# Patient Record
Sex: FEMALE | Race: WHITE | Employment: OTHER | URBAN - METROPOLITAN AREA
[De-identification: names, ages, dates, MRNs, and addresses within clinical notes are randomized per-mention and may not be internally consistent; named-entity substitution may affect disease eponyms.]

---

## 2021-10-29 ENCOUNTER — HOSPITAL ENCOUNTER (OUTPATIENT)
Dept: NEUROLOGY | Age: 56
Discharge: HOME | End: 2021-10-29
Payer: MEDICARE

## 2021-10-29 DIAGNOSIS — G40.909 NONINTRACTABLE EPILEPSY WITHOUT STATUS EPILEPTICUS, UNSPECIFIED EPILEPSY TYPE (HCC): ICD-10-CM

## 2021-10-29 PROCEDURE — 95819 EEG AWAKE AND ASLEEP: CPT

## 2021-10-29 PROCEDURE — 95816 EEG AWAKE AND DROWSY: CPT | Performed by: STUDENT IN AN ORGANIZED HEALTH CARE EDUCATION/TRAINING PROGRAM

## 2021-10-29 NOTE — PROCEDURES
ROUTINE ELECTROENCEPHALOGRAM    Identifying Information:  Name: Shanda Flores  MRN: [id number]  : 1965  Interpreting Physician: Tracey Talavera DO  Referring Physician: Killian Peguero DO  Date of EEG: 10/29/21  Procedure Location: Outpatient     Clinical History:  Shanda Flores is a 60 y.o. female with seizure like activity. Indication:  Rule out seizure/seizure disorder     Technical Summary:  28 channels of EEG were recorded in a digital format on a patient who was reported to be awake and drowsy during the recording. The patient was not sleep deprived prior to the EEG. The PDR consisted of well-developed, well-regulated 10-11 Hz alpha activity, maximal over the posterior head regions and reactive to eye opening and closure. Photic stimulation was performed and did not produce any abnormalities. During the recording stage II sleep was not seen. The EKG lead revealed no rhythm abnormalities. EEG Interpretation: This EEG was within normal limits for a patient of this age in the awake and drowsy states. No focal, lateralizing, or epileptiform features were seen during the recording. Clinical correlation is recommended.     Tracey Talavera DO  10/29/2021 2:10 PM

## 2021-11-11 ENCOUNTER — HOSPITAL ENCOUNTER (OUTPATIENT)
Dept: WOMENS IMAGING | Age: 56
Discharge: HOME | End: 2021-11-11
Payer: MEDICARE

## 2021-11-11 ENCOUNTER — TELEPHONE (OUTPATIENT)
Dept: NEUROLOGY | Age: 56
End: 2021-11-11

## 2021-11-11 DIAGNOSIS — Z12.31 ENCOUNTER FOR SCREENING MAMMOGRAM FOR MALIGNANT NEOPLASM OF BREAST: ICD-10-CM

## 2021-11-11 DIAGNOSIS — N95.9 POSTMENOPAUSAL SYMPTOMS: ICD-10-CM

## 2021-11-11 PROCEDURE — 77063 BREAST TOMOSYNTHESIS BI: CPT

## 2021-11-11 PROCEDURE — 77080 DXA BONE DENSITY AXIAL: CPT

## 2021-11-12 NOTE — TELEPHONE ENCOUNTER
Apologies for the delay. I had received the results and saw she was a DCD patient last and therefore I told one of my [facility] [address] staff to call her regarding the results. They must not have done so. Her EEG was normal.  Her right-sided shaking does not represent any epileptic activity.

## 2021-11-17 NOTE — TELEPHONE ENCOUNTER
Left message letting Mattie know if tremors are still bothering her to call us and we can set up a follow up appointment.

## 2021-12-23 ENCOUNTER — HOSPITAL ENCOUNTER (OUTPATIENT)
Dept: ULTRASOUND IMAGING | Age: 56
Discharge: HOME | End: 2021-12-23
Payer: MEDICARE

## 2021-12-23 ENCOUNTER — HOSPITAL ENCOUNTER (OUTPATIENT)
Dept: GENERAL RADIOLOGY | Age: 56
Discharge: HOME | End: 2021-12-23
Payer: MEDICARE

## 2021-12-23 ENCOUNTER — HOSPITAL ENCOUNTER (OUTPATIENT)
Dept: CT IMAGING | Age: 56
Discharge: HOME | End: 2021-12-23
Payer: MEDICARE

## 2021-12-23 ENCOUNTER — HOSPITAL ENCOUNTER (OUTPATIENT)
Age: 56
Discharge: HOME | End: 2021-12-23
Payer: MEDICARE

## 2021-12-23 DIAGNOSIS — Z82.49 FAMILY HISTORY OF ISCHEMIC HEART DISEASE: ICD-10-CM

## 2021-12-23 DIAGNOSIS — R10.11 ABDOMINAL PAIN, RIGHT UPPER QUADRANT: ICD-10-CM

## 2021-12-23 DIAGNOSIS — M25.522 LEFT ELBOW PAIN: ICD-10-CM

## 2021-12-23 DIAGNOSIS — Z82.49 FH: ANEURYSM: ICD-10-CM

## 2021-12-23 LAB
GFR AFRICAN AMERICAN: >60 ML/MIN/1.73M2
GFR NON-AFRICAN AMERICAN: >60 ML/MIN/1.73M2
POC CREATININE: 0.7 MG/DL (ref 0.6–1.1)

## 2021-12-23 PROCEDURE — 70460 CT HEAD/BRAIN W/DYE: CPT

## 2021-12-23 PROCEDURE — 76700 US EXAM ABDOM COMPLETE: CPT

## 2021-12-23 PROCEDURE — 2580000003 HC RX 258: Performed by: NURSE PRACTITIONER

## 2021-12-23 PROCEDURE — 73080 X-RAY EXAM OF ELBOW: CPT

## 2021-12-23 PROCEDURE — 6360000004 HC RX CONTRAST MEDICATION: Performed by: NURSE PRACTITIONER

## 2021-12-23 RX ORDER — SODIUM CHLORIDE 0.9 % (FLUSH) 0.9 %
5-40 SYRINGE (ML) INJECTION PRN
Status: DISCONTINUED | OUTPATIENT
Start: 2021-12-23 | End: 2021-12-24 | Stop reason: HOSPADM

## 2021-12-23 RX ADMIN — IOPAMIDOL 75 ML: 755 INJECTION, SOLUTION INTRAVENOUS at 10:40

## 2021-12-23 RX ADMIN — SODIUM CHLORIDE, PRESERVATIVE FREE 10 ML: 5 INJECTION INTRAVENOUS at 10:40

## 2022-02-18 ENCOUNTER — HOSPITAL ENCOUNTER (OUTPATIENT)
Dept: GENERAL RADIOLOGY | Age: 57
Discharge: HOME | End: 2022-02-18
Payer: MEDICARE

## 2022-02-18 ENCOUNTER — HOSPITAL ENCOUNTER (OUTPATIENT)
Age: 57
Discharge: HOME | End: 2022-02-18
Payer: MEDICARE

## 2022-02-18 DIAGNOSIS — M54.50 LOW BACK PAIN, UNSPECIFIED BACK PAIN LATERALITY, UNSPECIFIED CHRONICITY, UNSPECIFIED WHETHER SCIATICA PRESENT: ICD-10-CM

## 2022-02-18 DIAGNOSIS — M25.559 PAIN IN JOINT INVOLVING PELVIC REGION AND THIGH, UNSPECIFIED LATERALITY: ICD-10-CM

## 2022-02-18 PROCEDURE — 72100 X-RAY EXAM L-S SPINE 2/3 VWS: CPT

## 2022-02-18 PROCEDURE — 73502 X-RAY EXAM HIP UNI 2-3 VIEWS: CPT

## 2022-05-06 RX ORDER — LAMOTRIGINE 200 MG/1
200 TABLET ORAL 2 TIMES DAILY
Qty: 60 TABLET | Refills: 3 | Status: SHIPPED | OUTPATIENT
Start: 2022-05-06 | End: 2022-07-26 | Stop reason: SDUPTHER

## 2022-05-06 NOTE — TELEPHONE ENCOUNTER
Received call from pt stating she needs Lamictal refill and will be out of med Sunday. She saw Dr. Jeffry Thomason at [facility] and is scheduled to f/u here 7/25/22. Rx pending with pt reported dose. Please verify in [phone].

## 2022-07-26 ENCOUNTER — HOSPITAL ENCOUNTER (OUTPATIENT)
Age: 57
Discharge: HOME | End: 2022-07-26
Payer: MEDICARE

## 2022-07-26 ENCOUNTER — OFFICE VISIT (OUTPATIENT)
Dept: NEUROLOGY | Age: 57
End: 2022-07-26
Payer: MEDICARE

## 2022-07-26 VITALS
WEIGHT: 249.2 LBS | SYSTOLIC BLOOD PRESSURE: 116 MMHG | BODY MASS INDEX: 44.16 KG/M2 | DIASTOLIC BLOOD PRESSURE: 70 MMHG | HEART RATE: 70 BPM | OXYGEN SATURATION: 96 % | HEIGHT: 63 IN

## 2022-07-26 DIAGNOSIS — G40.909 NONINTRACTABLE EPILEPSY WITHOUT STATUS EPILEPTICUS, UNSPECIFIED EPILEPSY TYPE (HCC): Primary | ICD-10-CM

## 2022-07-26 DIAGNOSIS — G40.909 NONINTRACTABLE EPILEPSY WITHOUT STATUS EPILEPTICUS, UNSPECIFIED EPILEPSY TYPE (HCC): ICD-10-CM

## 2022-07-26 LAB
ALBUMIN SERPL-MCNC: 4 GM/DL (ref 3.4–5)
ALP BLD-CCNC: 86 IU/L (ref 40–129)
ALT SERPL-CCNC: 13 U/L (ref 10–40)
AST SERPL-CCNC: 13 IU/L (ref 15–37)
BASOPHILS ABSOLUTE: 0.1 K/CU MM
BASOPHILS RELATIVE PERCENT: 0.7 % (ref 0–1)
BILIRUB SERPL-MCNC: 0.4 MG/DL (ref 0–1)
BILIRUBIN DIRECT: 0.2 MG/DL (ref 0–0.3)
BILIRUBIN, INDIRECT: 0.2 MG/DL (ref 0–0.7)
DIFFERENTIAL TYPE: ABNORMAL
EOSINOPHILS ABSOLUTE: 0.3 K/CU MM
EOSINOPHILS RELATIVE PERCENT: 3.9 % (ref 0–3)
HCT VFR BLD CALC: 45.3 % (ref 37–47)
HEMOGLOBIN: 14.4 GM/DL (ref 12.5–16)
IMMATURE NEUTROPHIL %: 0.4 % (ref 0–0.43)
LYMPHOCYTES ABSOLUTE: 2.4 K/CU MM
LYMPHOCYTES RELATIVE PERCENT: 28 % (ref 24–44)
MCH RBC QN AUTO: 28.6 PG (ref 27–31)
MCHC RBC AUTO-ENTMCNC: 31.8 % (ref 32–36)
MCV RBC AUTO: 89.9 FL (ref 78–100)
MONOCYTES ABSOLUTE: 0.5 K/CU MM
MONOCYTES RELATIVE PERCENT: 6.3 % (ref 0–4)
NUCLEATED RBC %: 0 %
PDW BLD-RTO: 13.2 % (ref 11.7–14.9)
PLATELET # BLD: 290 K/CU MM (ref 140–440)
PMV BLD AUTO: 8.9 FL (ref 7.5–11.1)
RBC # BLD: 5.04 M/CU MM (ref 4.2–5.4)
SEGMENTED NEUTROPHILS ABSOLUTE COUNT: 5.2 K/CU MM
SEGMENTED NEUTROPHILS RELATIVE PERCENT: 60.7 % (ref 36–66)
TOTAL IMMATURE NEUTOROPHIL: 0.03 K/CU MM
TOTAL NUCLEATED RBC: 0 K/CU MM
TOTAL PROTEIN: 6.2 GM/DL (ref 6.4–8.2)
WBC # BLD: 8.6 K/CU MM (ref 4–10.5)

## 2022-07-26 PROCEDURE — 80175 DRUG SCREEN QUAN LAMOTRIGINE: CPT

## 2022-07-26 PROCEDURE — 85025 COMPLETE CBC W/AUTO DIFF WBC: CPT

## 2022-07-26 PROCEDURE — 36415 COLL VENOUS BLD VENIPUNCTURE: CPT

## 2022-07-26 PROCEDURE — 99214 OFFICE O/P EST MOD 30 MIN: CPT | Performed by: PSYCHIATRY & NEUROLOGY

## 2022-07-26 PROCEDURE — 80076 HEPATIC FUNCTION PANEL: CPT

## 2022-07-26 RX ORDER — PANTOPRAZOLE SODIUM 40 MG/1
TABLET, DELAYED RELEASE ORAL
COMMUNITY
Start: 2022-06-17

## 2022-07-26 RX ORDER — INSULIN GLARGINE 100 [IU]/ML
INJECTION, SOLUTION SUBCUTANEOUS
COMMUNITY
Start: 2022-06-21

## 2022-07-26 RX ORDER — ATORVASTATIN CALCIUM 20 MG/1
TABLET, FILM COATED ORAL
COMMUNITY
Start: 2022-05-04

## 2022-07-26 RX ORDER — CYCLOBENZAPRINE HCL 10 MG
TABLET ORAL
COMMUNITY
Start: 2022-06-17

## 2022-07-26 RX ORDER — EMPAGLIFLOZIN 10 MG/1
TABLET, FILM COATED ORAL
COMMUNITY
Start: 2022-07-20

## 2022-07-26 RX ORDER — BUSPIRONE HYDROCHLORIDE 10 MG/1
TABLET ORAL
COMMUNITY
Start: 2022-06-28

## 2022-07-26 RX ORDER — PEN NEEDLE, DIABETIC 32GX 5/32"
NEEDLE, DISPOSABLE MISCELLANEOUS
COMMUNITY
Start: 2022-05-31

## 2022-07-26 RX ORDER — CETIRIZINE HYDROCHLORIDE 10 MG/1
10 TABLET ORAL DAILY
COMMUNITY

## 2022-07-26 RX ORDER — LAMOTRIGINE 200 MG/1
200 TABLET ORAL 2 TIMES DAILY
Qty: 180 TABLET | Refills: 3 | Status: SHIPPED | OUTPATIENT
Start: 2022-07-26

## 2022-07-26 RX ORDER — CELECOXIB 200 MG/1
CAPSULE ORAL
COMMUNITY
Start: 2022-06-21

## 2022-07-26 RX ORDER — DULAGLUTIDE 1.5 MG/.5ML
INJECTION, SOLUTION SUBCUTANEOUS
COMMUNITY
Start: 2022-06-03

## 2022-07-26 RX ORDER — METOPROLOL SUCCINATE 100 MG/1
TABLET, EXTENDED RELEASE ORAL
COMMUNITY
Start: 2022-06-17

## 2022-07-26 NOTE — PROGRESS NOTES
7/26/22    Mattie Ramos  1965    Chief Complaint   Patient presents with    Seizures     Pt presents for seizures, pt states her last seizure was in August of 2020, pt states med regimen is working        History of Present Illness  Jone Thomas is a 60 y.o. female presenting today for follow-up of seizure and tremor. MRI brain with and wo contrast 11/25/20 unremarkable. EEG was negative for epileptiform features. She is taking Lamictal 200 mg twice daily for AED and Gabapentin 200 mg twice daily for tremor. Mattie states that she has had a few episodes of seizure like activity since our last visit in 2020. She describes her first episode in October 2021 as having her vision go black for a second in both eyes. The second episode a month ago she describes as and as feeling a pop in her neck and then feeling \"fuzzy\" for a few seconds. She denies LOC, biting her cheek, incontinence associated with her episodes. She states she takes her Lamictal as prescribed but about once a month she will forget her evening dose. Mattie states that her tremor is well controlled on Gabapentin. She only notices it when she gets upset over something or stressed. It does not affect her activities of daily living. She states she has tried Primidone and Topamax in the past but had adverse side effects to them. Current Outpatient Medications   Medication Sig Dispense Refill    atorvastatin (LIPITOR) 20 MG tablet       busPIRone (BUSPAR) 10 MG tablet       celecoxib (CELEBREX) 200 MG capsule       cyclobenzaprine (FLEXERIL) 10 MG tablet       TRULICITY 1.5 HL/2.6MP SOPN       JARDIANCE 10 MG tablet       LANTUS SOLOSTAR 100 UNIT/ML injection pen       RELION PEN NEEDLES 31G X 6 MM MISC       metoprolol succinate (TOPROL XL) 100 MG extended release tablet       pantoprazole (PROTONIX) 40 MG tablet       cetirizine (ZYRTEC) 10 MG tablet Take 10 mg by mouth in the morning.       lamoTRIgine (LAMICTAL) 200 MG tablet Take 1 tablet by mouth in the morning and 1 tablet before bedtime. 180 tablet 3     No current facility-administered medications for this visit. Physical Exam:    Mental Status   Orientation: oriented to person, oriented to place, oriented to problem, and oriented to time    Mood/affectappropriate mood and appropriate affect   Memory/Other: recent memory intact, remote memory intact, fund of knowledge intact, attention span normal, and concentration normal  Language  Language: (normal) language, no dysarthria, (normal) articulation, and no dysphasia/aphasia  Cranial Nerves   Eyes: pupils normal size and reactive to light and visual fields appear full   CN III, IV, VI : extraocular muscle strength normal, normal pursuit, no nystagmus, and no ptosis   Facial Motor: normal facial motor   CN XII: tongue protrudes midline  Motor/Coordination Exam   Power: motor strength appears intact throughout, no arm drift, and normal tone    Coordination: normal finger-to-nose, forearm rotation intact, and rapid alternating movement normal igh intensity low frequency action tremor bilaterally. Gait and Stance   Gait/Posture: station normal, casual gait normal, ambulates independently , tiptoe normal, and steady in Romberg's position with eyes open and closed        /70 (Site: Left Upper Arm, Position: Sitting, Cuff Size: Large Adult)   Pulse 70   Ht 5' 3\" (1.6 m)   Wt 249 lb 3.2 oz (113 kg)   SpO2 96%   BMI 44.14 kg/m²     Assessment and Plan     Diagnosis Orders   1. Nonintractable epilepsy without status epilepticus, unspecified epilepsy type ([facility] [address]  lamoTRIgine (LAMICTAL) 200 MG tablet    Lamotrigine Level    CBC with Auto Differential    Hepatic Function Panel        Mattie was seen in neurological follow up for seizures and tremor. Mattie describes two episodes since our last visit that sound most consistent with nonepileptic events however this warrants checking a lamitrigone level and will check liver enzymes as well.      In regards to her tremor, Phil Shah is satisfied with the level of tremor relief she is getting from the Gabapentin. She is tolerating it well. She was unable to tolerate Primidone or Topamax in the past.     We will see Mattie back in 1 year, she will notify us is she has any new concerning or worsening of her symptoms. Return in about 1 year (around 7/26/2023). SHANNA Bess - MARIA A      ------------------------------------    Attending Note:  I have seen this patient with Mao Cleaning NP. I have reviewed the chart and we have discussed this case in detail. The patient was seen and examined by myself. Pertinent labs and imaging have been personally reviewed. Our findings and impressions were discussed with the patient. I concur with the Nurse Practitioner's assessment and plan. Low suspicion her events are epileptiform in nature but will check a lamotrigine level to make sure it is therapeutic as well as maintenance labs while maintained on antiepileptic therapy. She is doing well on gabapentin for essential tremor.     Olga Dent DO 8/7/2022 8:58 PM

## 2022-07-27 LAB — LAMOTRIGINE LEVEL: 4.2 UG/ML (ref 3–15)

## 2022-08-16 ENCOUNTER — INITIAL CONSULT (OUTPATIENT)
Dept: OBGYN | Age: 57
End: 2022-08-16
Payer: MEDICARE

## 2022-08-16 VITALS
WEIGHT: 249 LBS | DIASTOLIC BLOOD PRESSURE: 74 MMHG | SYSTOLIC BLOOD PRESSURE: 122 MMHG | BODY MASS INDEX: 44.12 KG/M2 | HEIGHT: 63 IN

## 2022-08-16 DIAGNOSIS — G89.29 CHRONIC FEMALE PELVIC PAIN: Primary | ICD-10-CM

## 2022-08-16 DIAGNOSIS — Z12.31 SCREENING MAMMOGRAM FOR BREAST CANCER: ICD-10-CM

## 2022-08-16 DIAGNOSIS — R10.2 CHRONIC FEMALE PELVIC PAIN: Primary | ICD-10-CM

## 2022-08-16 DIAGNOSIS — Z01.419 ENCOUNTER FOR ANNUAL ROUTINE GYNECOLOGICAL EXAMINATION: ICD-10-CM

## 2022-08-16 PROCEDURE — 81003 URINALYSIS AUTO W/O SCOPE: CPT | Performed by: OBSTETRICS & GYNECOLOGY

## 2022-08-16 PROCEDURE — 36415 COLL VENOUS BLD VENIPUNCTURE: CPT | Performed by: OBSTETRICS & GYNECOLOGY

## 2022-08-16 PROCEDURE — 99203 OFFICE O/P NEW LOW 30 MIN: CPT | Performed by: OBSTETRICS & GYNECOLOGY

## 2022-08-16 SDOH — ECONOMIC STABILITY: FOOD INSECURITY: WITHIN THE PAST 12 MONTHS, YOU WORRIED THAT YOUR FOOD WOULD RUN OUT BEFORE YOU GOT MONEY TO BUY MORE.: SOMETIMES TRUE

## 2022-08-16 SDOH — ECONOMIC STABILITY: FOOD INSECURITY: WITHIN THE PAST 12 MONTHS, THE FOOD YOU BOUGHT JUST DIDN'T LAST AND YOU DIDN'T HAVE MONEY TO GET MORE.: SOMETIMES TRUE

## 2022-08-16 SDOH — ECONOMIC STABILITY: TRANSPORTATION INSECURITY
IN THE PAST 12 MONTHS, HAS THE LACK OF TRANSPORTATION KEPT YOU FROM MEDICAL APPOINTMENTS OR FROM GETTING MEDICATIONS?: NO

## 2022-08-16 SDOH — ECONOMIC STABILITY: TRANSPORTATION INSECURITY
IN THE PAST 12 MONTHS, HAS LACK OF TRANSPORTATION KEPT YOU FROM MEETINGS, WORK, OR FROM GETTING THINGS NEEDED FOR DAILY LIVING?: NO

## 2022-08-16 ASSESSMENT — SOCIAL DETERMINANTS OF HEALTH (SDOH): HOW HARD IS IT FOR YOU TO PAY FOR THE VERY BASICS LIKE FOOD, HOUSING, MEDICAL CARE, AND HEATING?: NOT VERY HARD

## 2022-08-16 ASSESSMENT — ENCOUNTER SYMPTOMS
EYES NEGATIVE: 1
ALLERGIC/IMMUNOLOGIC NEGATIVE: 1
RESPIRATORY NEGATIVE: 1
GASTROINTESTINAL NEGATIVE: 1

## 2022-08-16 ASSESSMENT — PATIENT HEALTH QUESTIONNAIRE - PHQ9
SUM OF ALL RESPONSES TO PHQ QUESTIONS 1-9: 0
1. LITTLE INTEREST OR PLEASURE IN DOING THINGS: 0
SUM OF ALL RESPONSES TO PHQ9 QUESTIONS 1 & 2: 0
2. FEELING DOWN, DEPRESSED OR HOPELESS: 0
SUM OF ALL RESPONSES TO PHQ QUESTIONS 1-9: 0

## 2022-08-16 NOTE — PROGRESS NOTES
8/16/22    Mattie Patino  1965    Chief Complaint   Patient presents with    Consultation     Consult from New pt here for annual. Pt had partial hysterectomy. No HRT, is sexually active, last pap 2018 normal. Mammo 2021 normal. Dexa 2021 normal. Colonoscopy 2018 normal. Pt c/o painful ovaries states she thinks the have \"popped' open. The patient is a 60 y.o. female, Yoly Kasper who presents for her annual exam. She is menopausal.  She is not taking HRT. Lexa Mtz She reports no gynecological symptoms. She has had a hysterectomy without removal of ovaries. She is  sexually active. Pap smear history: Her last PAP smear was in ?? .  Her results were normal.    Breast history: her most recent mammogram was in 2021. The results were: Normal    Osteoporosis Status: her bone density scan was in 2021. The results were osteopenia - treatment: calcium supplement    Colonoscopy Status: she had a colonoscopy in 2018.   The results were normal.    Past Medical History:   Diagnosis Date    Diabetes ([facility] [address]     Endometrial cancer ([facility] [address]        Past Surgical History:   Procedure Laterality Date    HYSTERECTOMY (CERVIX STATUS UNKNOWN)         Family History   Problem Relation Age of Onset    Hypertension Paternal Grandmother     Diabetes Paternal Grandmother     Hypertension Father     Diabetes Father     Cancer Mother        Social History     Tobacco Use    Smoking status: Every Day    Smokeless tobacco: Never   Vaping Use    Vaping Use: Never used   Substance Use Topics    Alcohol use: Never    Drug use: Never       Current Outpatient Medications   Medication Sig Dispense Refill    atorvastatin (LIPITOR) 20 MG tablet       busPIRone (BUSPAR) 10 MG tablet       celecoxib (CELEBREX) 200 MG capsule       cyclobenzaprine (FLEXERIL) 10 MG tablet       TRULICITY 1.5 NB/3.5MC SOPN       JARDIANCE 10 MG tablet       LANTUS SOLOSTAR 100 UNIT/ML injection pen       RELION PEN NEEDLES 31G X 6 MM MISC       metoprolol succinate (TOPROL XL) 100 MG extended release tablet       pantoprazole (PROTONIX) 40 MG tablet       cetirizine (ZYRTEC) 10 MG tablet Take 10 mg by mouth in the morning. lamoTRIgine (LAMICTAL) 200 MG tablet Take 1 tablet by mouth in the morning and 1 tablet before bedtime. 180 tablet 3     No current facility-administered medications for this visit. Allergies   Allergen Reactions    Latex     Codeine Anaphylaxis    Hydrocodone Anaphylaxis    Adhesive Tape Hives    Metformin And Related     Phenobarbital      Per pt    Sulfa Antibiotics     Eggs Or Egg-Derived Products Nausea And Vomiting             There is no immunization history on file for this patient. Review of Systems   Constitutional: Negative. Eyes: Negative. Respiratory: Negative. Cardiovascular: Negative. Gastrointestinal: Negative. Endocrine: Negative. Genitourinary:  Positive for pelvic pain. Musculoskeletal: Negative. Skin: Negative. Allergic/Immunologic: Negative. Neurological: Negative. Hematological: Negative. Psychiatric/Behavioral: Negative. /74   Ht 5' 3\" (1.6 m)   Wt 249 lb (112.9 kg)   BMI 44.11 kg/m²     Physical Exam  Exam conducted with a chaperone present. Constitutional:       Appearance: Normal appearance. HENT:      Head: Normocephalic and atraumatic. Nose: Nose normal.   Cardiovascular:      Rate and Rhythm: Normal rate. Pulses: Normal pulses. Pulmonary:      Effort: Pulmonary effort is normal.   Chest:      Chest wall: No mass, lacerations, deformity, swelling or tenderness. Breasts:     Right: Normal. No swelling, bleeding, inverted nipple, mass, nipple discharge, skin change, tenderness, axillary adenopathy or supraclavicular adenopathy. Left: Normal. No swelling, bleeding, inverted nipple, mass, nipple discharge, skin change, tenderness, axillary adenopathy or supraclavicular adenopathy. Abdominal:      General: Abdomen is flat.  There is no distension. Palpations: Abdomen is soft. There is no mass. Tenderness: There is no abdominal tenderness. Hernia: No hernia is present. There is no hernia in the left inguinal area or right inguinal area. Genitourinary:     Exam position: Lithotomy position. Pubic Area: No rash. Labia:         Right: No rash, tenderness or lesion. Left: No rash, tenderness or lesion. Urethra: No prolapse, urethral pain, urethral swelling or urethral lesion. Vagina: Normal. No vaginal discharge, erythema, tenderness, bleeding or lesions. Uterus: Absent. Adnexa: Right adnexa normal and left adnexa normal.        Right: No mass, tenderness or fullness. Left: No mass, tenderness or fullness. Rectum: No mass or anal fissure. Normal anal tone. Musculoskeletal:      Cervical back: Normal range of motion and neck supple. Lymphadenopathy:      Upper Body:      Right upper body: No supraclavicular, axillary or pectoral adenopathy. Left upper body: No supraclavicular, axillary or pectoral adenopathy. Lower Body: No right inguinal adenopathy. No left inguinal adenopathy. Skin:     General: Skin is warm and dry. Neurological:      General: No focal deficit present. Mental Status: She is alert and oriented to person, place, and time. Psychiatric:         Mood and Affect: Mood normal.         Behavior: Behavior normal.         Thought Content: Thought content normal.         Judgment: Judgment normal.       No results found for this visit on 08/16/22. Assessment and Plan   Diagnosis Orders   1. Chronic female pelvic pain  US NON OB TRANSVAGINAL    Urinalysis with Reflex to Culture    CBC with Auto Differential      2. Encounter for annual routine gynecological examination        3. Screening mammogram for breast cancer  WILNER DIGITAL SCREEN W OR WO CAD BILATERAL        Follow up after ultrasound for further discussion  No follow-ups on file.     Milton Cata Munoz MD

## 2022-08-30 LAB
BASOPHILS ABSOLUTE: 0.1 K/UL (ref 0–0.2)
BASOPHILS RELATIVE PERCENT: 0.7 %
BILIRUBIN URINE: NEGATIVE
BLOOD, URINE: NEGATIVE
CLARITY: CLEAR
COLOR: YELLOW
EOSINOPHILS ABSOLUTE: 0.2 K/UL (ref 0–0.6)
EOSINOPHILS RELATIVE PERCENT: 2.3 %
GLUCOSE URINE: 500 MG/DL
HCT VFR BLD CALC: 42.7 % (ref 36–48)
HEMOGLOBIN: 14.4 G/DL (ref 12–16)
KETONES, URINE: NEGATIVE MG/DL
LEUKOCYTE ESTERASE, URINE: NEGATIVE
LYMPHOCYTES ABSOLUTE: 1.8 K/UL (ref 1–5.1)
LYMPHOCYTES RELATIVE PERCENT: 19.9 %
MCH RBC QN AUTO: 28.9 PG (ref 26–34)
MCHC RBC AUTO-ENTMCNC: 33.8 G/DL (ref 31–36)
MCV RBC AUTO: 85.6 FL (ref 80–100)
MICROSCOPIC EXAMINATION: ABNORMAL
MONOCYTES ABSOLUTE: 0.5 K/UL (ref 0–1.3)
MONOCYTES RELATIVE PERCENT: 5.7 %
NEUTROPHILS ABSOLUTE: 6.6 K/UL (ref 1.7–7.7)
NEUTROPHILS RELATIVE PERCENT: 71.4 %
NITRITE, URINE: NEGATIVE
PDW BLD-RTO: 13.9 % (ref 12.4–15.4)
PH UA: 6.5 (ref 5–8)
PLATELET # BLD: 307 K/UL (ref 135–450)
PMV BLD AUTO: 7 FL (ref 5–10.5)
PROTEIN UA: NEGATIVE MG/DL
RBC # BLD: 4.99 M/UL (ref 4–5.2)
SPECIFIC GRAVITY UA: 1.03 (ref 1–1.03)
URINE REFLEX TO CULTURE: ABNORMAL
URINE TYPE: ABNORMAL
UROBILINOGEN, URINE: 0.2 E.U./DL
WBC # BLD: 9.3 K/UL (ref 4–11)

## 2022-09-01 ENCOUNTER — OFFICE VISIT (OUTPATIENT)
Dept: OBGYN | Age: 57
End: 2022-09-01
Payer: MEDICARE

## 2022-09-01 VITALS
BODY MASS INDEX: 44.12 KG/M2 | DIASTOLIC BLOOD PRESSURE: 77 MMHG | HEART RATE: 86 BPM | HEIGHT: 63 IN | SYSTOLIC BLOOD PRESSURE: 131 MMHG | WEIGHT: 249 LBS

## 2022-09-01 DIAGNOSIS — G89.29 CHRONIC FEMALE PELVIC PAIN: Primary | ICD-10-CM

## 2022-09-01 DIAGNOSIS — R10.2 CHRONIC FEMALE PELVIC PAIN: Primary | ICD-10-CM

## 2022-09-01 PROCEDURE — 99213 OFFICE O/P EST LOW 20 MIN: CPT | Performed by: OBSTETRICS & GYNECOLOGY

## 2022-09-01 NOTE — PROGRESS NOTES
22    Mattie Hull Hazard  1965    Chief Complaint   Patient presents with    Follow-up     Pt had u/s and labs due to pelvic pain . praveena Weber is a 60 y.o. female who presents today for evaluation of pelvic pain. Reports mild intermittent pelvic pain. History of uterine cancer diagnosed in . Past Medical History:   Diagnosis Date    Diabetes ([facility] [address]     Endometrial cancer ([facility] [address]        Past Surgical History:   Procedure Laterality Date    HYSTERECTOMY (CERVIX STATUS UNKNOWN)         Social History     Tobacco Use    Smoking status: Every Day    Smokeless tobacco: Never   Vaping Use    Vaping Use: Never used   Substance Use Topics    Alcohol use: Never    Drug use: Never       Family History   Problem Relation Age of Onset    Hypertension Paternal Grandmother     Diabetes Paternal Grandmother     Hypertension Father     Diabetes Father     Cancer Mother        Current Outpatient Medications   Medication Sig Dispense Refill    atorvastatin (LIPITOR) 20 MG tablet       busPIRone (BUSPAR) 10 MG tablet       celecoxib (CELEBREX) 200 MG capsule       cyclobenzaprine (FLEXERIL) 10 MG tablet       TRULICITY 1.5 LM/9.9EJ SOPN       JARDIANCE 10 MG tablet       LANTUS SOLOSTAR 100 UNIT/ML injection pen       RELION PEN NEEDLES 31G X 6 MM MISC       metoprolol succinate (TOPROL XL) 100 MG extended release tablet       pantoprazole (PROTONIX) 40 MG tablet       cetirizine (ZYRTEC) 10 MG tablet Take 10 mg by mouth in the morning. lamoTRIgine (LAMICTAL) 200 MG tablet Take 1 tablet by mouth in the morning and 1 tablet before bedtime. 180 tablet 3     No current facility-administered medications for this visit.        Allergies   Allergen Reactions    Latex     Codeine Anaphylaxis    Hydrocodone Anaphylaxis    Adhesive Tape Hives    Metformin And Related     Phenobarbital      Per pt    Sulfa Antibiotics     Eggs Or Egg-Derived Products Nausea And Vomiting             There is no immunization history on file for this patient. Review of Systems    /77   Pulse 86   Ht 5' 3\" (1.6 m)   Wt 249 lb (112.9 kg)   BMI 44.11 kg/m²     Physical Exam  Constitutional:       General: She is not in acute distress. Appearance: Normal appearance. She is not ill-appearing. HENT:      Head: Normocephalic. Nose: Nose normal.   Eyes:      General: No scleral icterus. Right eye: No discharge. Left eye: No discharge. Cardiovascular:      Pulses: Normal pulses. Pulmonary:      Effort: Pulmonary effort is normal.   Abdominal:      General: Abdomen is flat. There is no distension. Palpations: Abdomen is soft. There is no mass. Tenderness: no abdominal tenderness      Hernia: No hernia is present. Musculoskeletal:         General: Normal range of motion. Cervical back: Normal range of motion and neck supple. No rigidity. Skin:     General: Skin is warm and dry. Neurological:      General: No focal deficit present. Mental Status: She is alert and oriented to person, place, and time.    Psychiatric:         Mood and Affect: Mood normal.         Behavior: Behavior normal.     08/30/2022 1351 08/30/2022 2235 CBC with Auto Differential [[id number]]   Blood    Component Value Units   WBC 9.3 K/uL   RBC 4.99 M/uL   Hemoglobin 14.4 g/dL   Hematocrit 42.7 %   MCV 85.6 fL   MCH 28.9 pg   MCHC 33.8 g/dL   RDW 13.9 %   Platelets 732 K/uL   MPV 7.0 fL   Neutrophils % 71.4 %   Lymphocytes % 19.9 %   Monocytes % 5.7 %   Eosinophils % 2.3 %   Basophils % 0.7 %   Neutrophils Absolute 6.6 K/uL   Lymphocytes Absolute 1.8 K/uL   Monocytes Absolute 0.5 K/uL   Eosinophils Absolute 0.2 K/uL   Basophils Absolute 0.1 K/uL          08/30/2022 1355 08/30/2022 2226 Urinalysis with Reflex to Culture [[id number]]   (Abnormal)   Urine    Component Value Units   Color, UA Yellow    Clarity, UA Clear    Glucose, Ur 500 Abnormal  mg/dL   Bilirubin Urine Negative    Ketones, Urine Negative mg/dL   Specific Gravity, UA 1.035    Blood, Urine Negative    pH, UA 6.5    Protein, UA Negative mg/dL   Urobilinogen, Urine 0.2 E.U./dL   Nitrite, Urine Negative    Leukocyte Esterase, Urine Negative    Microscopic Examination Not Indicated    Urine Type Cleancatch    Urine Reflex to Culture Not Indicated             No results found for this visit on 09/01/22. ASSESSMENT AND PLAN   Diagnosis Orders   1. Chronic female pelvic pain          Follow up with PCP>    DIscussed normal GYN, normal urinalysis. Discussed that this pain does not appear GYN related. Discussed the option of BSO but I do not believe this would help the pain. SHe will discuss further evaluation and treatment options with pcp. Return if symptoms worsen or fail to improve.     Jd Mora MD